# Patient Record
Sex: FEMALE | Race: WHITE | Employment: STUDENT | URBAN - NONMETROPOLITAN AREA
[De-identification: names, ages, dates, MRNs, and addresses within clinical notes are randomized per-mention and may not be internally consistent; named-entity substitution may affect disease eponyms.]

---

## 2019-05-31 ENCOUNTER — OFFICE VISIT (OUTPATIENT)
Dept: FAMILY MEDICINE CLINIC | Age: 24
End: 2019-05-31
Payer: COMMERCIAL

## 2019-05-31 VITALS
TEMPERATURE: 98 F | DIASTOLIC BLOOD PRESSURE: 60 MMHG | OXYGEN SATURATION: 99 % | HEIGHT: 62 IN | SYSTOLIC BLOOD PRESSURE: 106 MMHG | WEIGHT: 110 LBS | HEART RATE: 88 BPM | BODY MASS INDEX: 20.24 KG/M2

## 2019-05-31 DIAGNOSIS — L73.9 FOLLICULITIS: Primary | ICD-10-CM

## 2019-05-31 PROCEDURE — 99213 OFFICE O/P EST LOW 20 MIN: CPT | Performed by: INTERNAL MEDICINE

## 2019-05-31 RX ORDER — NORETHINDRONE ACETATE AND ETHINYL ESTRADIOL AND FERROUS FUMARATE 1MG-20(24)
KIT ORAL
COMMUNITY

## 2019-05-31 RX ORDER — CEPHALEXIN 500 MG/1
500 CAPSULE ORAL 2 TIMES DAILY
Qty: 20 CAPSULE | Refills: 0 | Status: SHIPPED | OUTPATIENT
Start: 2019-05-31 | End: 2019-06-10

## 2019-05-31 NOTE — PROGRESS NOTES
[address] [facility]     19  Mauri Noriega : 1995 Sex: female  Age: 26 y.o. Chief Complaint   Patient presents with    Rash     artemio armpit 2days       HPI  Patient presents to express care accompanied by her mother complaining of a rash to both axilla. Ongoing for a couple days. States this occurred after shaving. No new deodorants or topicals. Review of Systems   Constitutional: Negative for chills and fever. Skin: Positive for rash. REST OF PERTINENT ROS GONE OVER AND WAS NEGATIVE. Current Outpatient Medications:     Fexofenadine-Pseudoephedrine (ALLEGRA-D 12 HOUR PO), Take by mouth, Disp: , Rfl:     Norethin Ace-Eth Estrad-FE (MELODETTA 24 FE) 1-20 MG-MCG(24) CHEW, Take by mouth, Disp: , Rfl:     cephALEXin (KEFLEX) 500 MG capsule, Take 1 capsule by mouth 2 times daily for 10 days, Disp: 20 capsule, Rfl: 0    mupirocin (BACTROBAN) 2 % ointment, Apply 3 times daily. , Disp: 15 g, Rfl: 0  Allergies   Allergen Reactions    Erythromycin     Zithromax [Azithromycin]        No past medical history on file. No past surgical history on file. No family history on file.   Social History     Socioeconomic History    Marital status: Single     Spouse name: Not on file    Number of children: Not on file    Years of education: Not on file    Highest education level: Not on file   Occupational History    Not on file   Social Needs    Financial resource strain: Not on file    Food insecurity:     Worry: Not on file     Inability: Not on file    Transportation needs:     Medical: Not on file     Non-medical: Not on file   Tobacco Use    Smoking status: Not on file   Substance and Sexual Activity    Alcohol use: Not on file    Drug use: Not on file    Sexual activity: Not on file   Lifestyle    Physical activity:     Days per week: Not on file     Minutes per session: Not on file    Stress: Not on file   Relationships    Social connections:     Talks on phone: Not on file Gets together: Not on file     Attends Zoroastrian service: Not on file     Active member of club or organization: Not on file     Attends meetings of clubs or organizations: Not on file     Relationship status: Not on file    Intimate partner violence:     Fear of current or ex partner: Not on file     Emotionally abused: Not on file     Physically abused: Not on file     Forced sexual activity: Not on file   Other Topics Concern    Not on file   Social History Narrative    Not on file       Vitals:    05/31/19 1345   BP: 106/60   Pulse: 88   Temp: 98 °F (36.7 °C)   TempSrc: Temporal   SpO2: 99%   Weight: 110 lb (49.9 kg)   Height: 5' 2\" (1.575 m)       Physical Exam   Constitutional: She appears well-developed and well-nourished. No distress. Skin: Skin is warm and dry. Rash noted. There is erythema. Several small red papular lesions to both axilla no open areas. Vitals reviewed. Assessment and Plan:  Azeb Jiménez was seen today for rash. Diagnoses and all orders for this visit:    Folliculitis  -     cephALEXin (KEFLEX) 500 MG capsule; Take 1 capsule by mouth 2 times daily for 10 days  -     mupirocin (BACTROBAN) 2 % ointment; Apply 3 times daily. Plan: Told to avoid deodorants and shaving for the next several days. Start cephalexin and Bactroban. The potential side effects. Probiotic. Push fluids. Follow with PCP. Notify us if not improving. Return if symptoms worsen or fail to improve. Seen By:  Boby Carpio MD      *Document was created using voice recognition software. Note was reviewed however may contain grammatical errors.